# Patient Record
Sex: MALE | URBAN - METROPOLITAN AREA
[De-identification: names, ages, dates, MRNs, and addresses within clinical notes are randomized per-mention and may not be internally consistent; named-entity substitution may affect disease eponyms.]

---

## 2019-02-01 ENCOUNTER — NON-PROVIDER VISIT (OUTPATIENT)
Dept: URGENT CARE | Facility: CLINIC | Age: 24
End: 2019-02-01

## 2019-02-01 PROCEDURE — 8907 PR URINE COLLECT ONLY: Performed by: PHYSICIAN ASSISTANT

## 2021-04-13 ENCOUNTER — IMMUNIZATION (OUTPATIENT)
Dept: FAMILY PLANNING/WOMEN'S HEALTH CLINIC | Facility: IMMUNIZATION CENTER | Age: 26
End: 2021-04-13
Payer: OTHER GOVERNMENT

## 2021-04-13 DIAGNOSIS — Z23 ENCOUNTER FOR VACCINATION: Primary | ICD-10-CM

## 2021-04-13 PROCEDURE — 0001A PFIZER SARS-COV-2 VACCINE: CPT | Performed by: INTERNAL MEDICINE

## 2021-04-13 PROCEDURE — 91300 PFIZER SARS-COV-2 VACCINE: CPT | Performed by: INTERNAL MEDICINE

## 2024-09-12 ENCOUNTER — APPOINTMENT (OUTPATIENT)
Dept: URGENT CARE | Facility: PHYSICIAN GROUP | Age: 29
End: 2024-09-12

## 2025-02-01 ENCOUNTER — HOSPITAL ENCOUNTER (EMERGENCY)
Facility: MEDICAL CENTER | Age: 30
End: 2025-02-01
Attending: EMERGENCY MEDICINE

## 2025-02-01 VITALS
BODY MASS INDEX: 28.03 KG/M2 | RESPIRATION RATE: 18 BRPM | DIASTOLIC BLOOD PRESSURE: 104 MMHG | WEIGHT: 178.57 LBS | HEIGHT: 67 IN | SYSTOLIC BLOOD PRESSURE: 160 MMHG | OXYGEN SATURATION: 98 % | HEART RATE: 96 BPM | TEMPERATURE: 97.6 F

## 2025-02-01 DIAGNOSIS — T07.XXXA MULTIPLE ABRASIONS: ICD-10-CM

## 2025-02-01 DIAGNOSIS — S51.812A LACERATION OF LEFT FOREARM, INITIAL ENCOUNTER: ICD-10-CM

## 2025-02-01 PROCEDURE — 303353 HCHG DERMABOND SKIN ADHESIVE

## 2025-02-01 PROCEDURE — 99282 EMERGENCY DEPT VISIT SF MDM: CPT

## 2025-02-01 PROCEDURE — 303747 HCHG EXTRA SUTURE

## 2025-02-01 PROCEDURE — 90715 TDAP VACCINE 7 YRS/> IM: CPT | Performed by: EMERGENCY MEDICINE

## 2025-02-01 PROCEDURE — 700111 HCHG RX REV CODE 636 W/ 250 OVERRIDE (IP): Performed by: EMERGENCY MEDICINE

## 2025-02-01 PROCEDURE — 304999 HCHG REPAIR-SIMPLE/INTERMED LEVEL 1

## 2025-02-01 PROCEDURE — 304217 HCHG IRRIGATION SYSTEM

## 2025-02-01 PROCEDURE — 90471 IMMUNIZATION ADMIN: CPT

## 2025-02-01 RX ADMIN — CLOSTRIDIUM TETANI TOXOID ANTIGEN (FORMALDEHYDE INACTIVATED), CORYNEBACTERIUM DIPHTHERIAE TOXOID ANTIGEN (FORMALDEHYDE INACTIVATED), BORDETELLA PERTUSSIS TOXOID ANTIGEN (GLUTARALDEHYDE INACTIVATED), BORDETELLA PERTUSSIS FILAMENTOUS HEMAGGLUTININ ANTIGEN (FORMALDEHYDE INACTIVATED), BORDETELLA PERTUSSIS PERTACTIN ANTIGEN, AND BORDETELLA PERTUSSIS FIMBRIAE 2/3 ANTIGEN 0.5 ML: 5; 2; 2.5; 5; 3; 5 INJECTION, SUSPENSION INTRAMUSCULAR at 18:52

## 2025-02-01 ASSESSMENT — PAIN DESCRIPTION - PAIN TYPE: TYPE: ACUTE PAIN

## 2025-02-02 NOTE — DISCHARGE INSTRUCTIONS
You were seen in the Emergency Department for lacerations.    Please use 1,000mg of tylenol or 600mg of ibuprofen every 6 hours as needed for pain.    Sutures placed are absorbable and should absorb within the next 5 to 7 days.  Glue should also follow-up over that time.      Keep wound dry for the first 24 hours.  After that you can wash with soap and water.  Do not use hydrogen peroxide or alcohol in the wounds.  You may apply antibiotic ointment.    Please follow up with your primary care physician.    Return to the Emergency Department with signs of surrounding infection or other concerns.

## 2025-02-02 NOTE — ED NOTES
AVS discussed with patient. F/u and return precautions discussed. Ambulatory with steady gait to [facility]. Wound care discussed. Given additional bandages for home

## 2025-02-02 NOTE — ED TRIAGE NOTES
Chief Complaint   Patient presents with    Hand Laceration     Patient ambulatory to triage with above complaint. Report he punched a glass about 30 minutes ago. Laceration to left lower thumb and 2 lacerations to left forearm.   Bleeding controlled with bandage.

## 2025-02-02 NOTE — ED PROVIDER NOTES
"ED Provider Note    CHIEF COMPLAINT  Chief Complaint   Patient presents with    Hand Laceration     EXTERNAL RECORDS REVIEWED  No recent medical records available for review at this time.     HPI/ROS  LIMITATION TO HISTORY   Select: : None  OUTSIDE HISTORIAN(S):  None.     Shubham Manriquez is a 29 y.o. male who presents to the Emergency Department for evaluation of lacerations to his left upper extremity onset prior to arrival today. Patient reports that he was frustrated and punched through a glass window about thirty minutes before arrival today and sustained a laceration to his left thumb and two lacerations to his left forearm. Patient denies any other complaints at this time. Patient's tetanus status is up to date.     PAST MEDICAL HISTORY  No past medical history on file.     SURGICAL HISTORY  No past surgical history on file.     FAMILY HISTORY  No family history on file.    SOCIAL HISTORY   reports that he has never smoked. He does not have any smokeless tobacco history on file. He reports that he does not drink alcohol.    ALLERGIES  Pcn [penicillins]    PHYSICAL EXAM  BP (!) 154/102   Pulse 85   Temp 36.4 °C (97.6 °F) (Temporal)   Resp 16   Ht 1.702 m (5' 7\")   Wt 81 kg (178 lb 9.2 oz)   SpO2 98%    Constitutional: Nontoxic appearing. Alert in no apparent distress.  HENT: Normocephalic, Atraumatic.  Moist mucous membranes.    Neck: Supple, full range of motion  Musculoskeletal: Atraumatic. No obvious deformities noted.  Good range of motion of the entire left wrist, forearm and elbow without pain.  Skin: Warm, Dry.  No erythema, No rash. Multiple small abrasions to the left palm, posterior forearm superficial laceration about 4 cm. Posterior elbow with small skin avulsion. Anterior forearm V shaped small 1 cm laceration.   Neurologic: Alert and oriented x3. Moving all extremities spontaneously without focal deficits.  Psychiatric: Affect normal, Mood normal, Appears appropriate and not " intoxicated.      DIAGNOSTIC STUDIES / PROCEDURES    Laceration Repair Procedure Note    Indication: Lacerations    Procedure: The patient was placed in the appropriate position and anesthesia around the lacerations wereobtained by infiltration using 2% Lidocaine with epinephrine. The wound was minimally contaminated .The area was then irrigated with normal saline. The laceration was closed with 5-0 fast absorbing gut using interrupted sutures. A second laceration was closed with Dermabond. The wound area was then dressed with a sterile dressing.      Total repaired wound length: 5 cm.     Other Items: Suture count: 3    The patient tolerated the procedure well.    Complications: None     COURSE & MEDICAL DECISION MAKING    6:24 PM - Patient seen and examined at bedside. Patient arrives today with abrasions and lacerations to the left hand and forearm after punching a glass window today. Discussed plan of care, including gluing his small lacerations and placing a suture in his left anterior forearm laceration. Patient agrees to the plan of care.       ASSESSMENT, COURSE AND PLAN  Care Narrative: Patient presents with multiple minor lacerations to the left arm after punching a glass window just prior to arrival.  He is hypertensive and tachycardic on arrival however vital signs normalized.  He has no other evidence of injury.  I considered diagnostic imaging however no concern for underlying fracture or foreign body.  Most of his lacerations are superficial however there was a small 1 that required sutures and another that required Dermabond.  Patient's tetanus was updated.     - Upon reassessment, patient is resting comfortably with normal vital signs.  No new complaints at this time.  Discussed results with patient and/or family as well as importance of primary care follow up.  Patient understands plan of care and strict return precautions for new or changing symptoms.    ADDITIONAL PROBLEM LIST  Problem #1: Acute  multiple left forearm lacerations -discharged home with instructions on wound care      DISPOSITION AND DISCUSSIONS    Escalation of care considered, and ultimately not performed:diagnostic imaging    Barriers to care at this time, including but not limited to: Patient does not have established PCP.     Decision tools and prescription drugs considered including, but not limited to: Pain Medications OTC medication should be sufficient .     The patient will return for new or worsening symptoms and is stable at the time of discharge.    DISPOSITION:  Patient will be discharged home in stable condition.    FOLLOW UP:  [facility], Emergency Dept  [facility] [facility] [id number] [phone]    If symptoms worsen      OUTPATIENT MEDICATIONS:  There are no discharge medications for this patient.       FINAL DIAGNOSIS  1. Laceration of left forearm, initial encounter    2. Multiple abrasions        The note accurately reflects work and decisions made by me.  Ayana Chaudhary M.D.  2/1/2025  8:09 PM     Aquiles JARQUIN (Scribe), am scribing for, and in the presence of, Ayana Chaudhary M.D..    Electronically signed by: Aquiles Hubbard (Kaleigh), 2/1/2025    Ayana JARQUIN M.D. personally performed the services described in this documentation, as scribed by Aquiles Hubbard in my presence, and it is both accurate and complete.